# Patient Record
Sex: FEMALE | Race: BLACK OR AFRICAN AMERICAN | Employment: FULL TIME | URBAN - METROPOLITAN AREA
[De-identification: names, ages, dates, MRNs, and addresses within clinical notes are randomized per-mention and may not be internally consistent; named-entity substitution may affect disease eponyms.]

---
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
## 2022-01-01 LAB — URINE CULTURE, ROUTINE: NORMAL

## 2022-01-02 LAB
C TRACH DNA GENITAL QL NAA+PROBE: NEGATIVE
N. GONORRHOEAE DNA: NEGATIVE

## 2022-01-03 LAB
FINAL REPORT: NORMAL
PRELIMINARY: NORMAL

## 2022-01-04 NOTE — RESULT ENCOUNTER NOTE
Culture reviewed, please notify patient result was positive for herpes but she was appropriately treated

## 2022-01-04 NOTE — RESULT ENCOUNTER NOTE
Patient's positive result has been appropriately evaluated by the provider pool.    Patient was contacted and notified of the results    Medication was phoned to the patient's pharmacy per protocol:    Pharmacy: No Pharmacies Listed  Phone number:   Pharmacist receiving the prescription: